# Patient Record
Sex: FEMALE | Race: WHITE
[De-identification: names, ages, dates, MRNs, and addresses within clinical notes are randomized per-mention and may not be internally consistent; named-entity substitution may affect disease eponyms.]

---

## 2021-12-01 ENCOUNTER — HOSPITAL ENCOUNTER (INPATIENT)
Dept: HOSPITAL 79 - GENOP | Age: 26
LOS: 2 days | Discharge: HOME | End: 2021-12-03
Attending: OBSTETRICS & GYNECOLOGY | Admitting: OBSTETRICS & GYNECOLOGY
Payer: COMMERCIAL

## 2021-12-01 VITALS — HEIGHT: 64 IN | WEIGHT: 210 LBS | BODY MASS INDEX: 35.85 KG/M2

## 2021-12-01 DIAGNOSIS — Z23: ICD-10-CM

## 2021-12-01 DIAGNOSIS — Z86.73: ICD-10-CM

## 2021-12-01 DIAGNOSIS — F32.A: ICD-10-CM

## 2021-12-01 DIAGNOSIS — D62: ICD-10-CM

## 2021-12-01 DIAGNOSIS — Z82.49: ICD-10-CM

## 2021-12-01 DIAGNOSIS — Z20.822: ICD-10-CM

## 2021-12-01 DIAGNOSIS — Z80.9: ICD-10-CM

## 2021-12-01 DIAGNOSIS — F41.9: ICD-10-CM

## 2021-12-01 DIAGNOSIS — Z83.3: ICD-10-CM

## 2021-12-01 DIAGNOSIS — Z3A.40: ICD-10-CM

## 2021-12-01 LAB
HGB BLD-MCNC: 10.7 GM/DL (ref 12.3–15.3)
RED BLOOD COUNT: 4.46 M/UL (ref 4–5.1)
WHITE BLOOD COUNT: 13.1 K/UL (ref 4.5–11)

## 2021-12-01 PROCEDURE — 4A1HXCZ MONITORING OF PRODUCTS OF CONCEPTION, CARDIAC RATE, EXTERNAL APPROACH: ICD-10-PCS | Performed by: OBSTETRICS & GYNECOLOGY

## 2021-12-01 PROCEDURE — U0002 COVID-19 LAB TEST NON-CDC: HCPCS

## 2021-12-01 PROCEDURE — 3E0234Z INTRODUCTION OF SERUM, TOXOID AND VACCINE INTO MUSCLE, PERCUTANEOUS APPROACH: ICD-10-PCS | Performed by: OBSTETRICS & GYNECOLOGY

## 2021-12-02 LAB — HGB BLD-MCNC: 8.9 GM/DL (ref 12.3–15.3)

## 2022-01-21 ENCOUNTER — HOSPITAL ENCOUNTER (EMERGENCY)
Dept: HOSPITAL 79 - ER1 | Age: 27
LOS: 1 days | Discharge: HOME | End: 2022-01-22
Payer: COMMERCIAL

## 2022-01-21 DIAGNOSIS — K21.9: ICD-10-CM

## 2022-01-21 DIAGNOSIS — K80.20: ICD-10-CM

## 2022-01-21 DIAGNOSIS — K29.70: Primary | ICD-10-CM

## 2022-01-21 DIAGNOSIS — E11.9: ICD-10-CM

## 2022-01-21 LAB
BUN/CREATININE RATIO: 20 (ref 0–10)
HGB BLD-MCNC: 11.7 GM/DL (ref 12.3–15.3)
RED BLOOD COUNT: 4.83 M/UL (ref 4–5.1)
WHITE BLOOD COUNT: 11.2 K/UL (ref 4.5–11)

## 2022-01-21 PROCEDURE — C9113 INJ PANTOPRAZOLE SODIUM, VIA: HCPCS

## 2024-03-13 ENCOUNTER — HOSPITAL ENCOUNTER (OUTPATIENT)
Dept: ULTRASOUND IMAGING | Facility: HOSPITAL | Age: 29
Discharge: HOME | End: 2024-03-13
Admitting: OBSTETRICS & GYNECOLOGY
Payer: COMMERCIAL

## 2024-03-13 DIAGNOSIS — R23.4 BREAST SKIN CHANGES: ICD-10-CM

## 2024-03-13 PROCEDURE — 76642 ULTRASOUND BREAST LIMITED: CPT

## 2024-08-06 ENCOUNTER — OFFICE VISIT (OUTPATIENT)
Dept: GASTROENTEROLOGY | Facility: CLINIC | Age: 29
End: 2024-08-06
Payer: COMMERCIAL

## 2024-08-06 VITALS
HEIGHT: 64 IN | HEART RATE: 76 BPM | BODY MASS INDEX: 34.83 KG/M2 | DIASTOLIC BLOOD PRESSURE: 76 MMHG | SYSTOLIC BLOOD PRESSURE: 115 MMHG | WEIGHT: 204 LBS

## 2024-08-06 DIAGNOSIS — K62.5 BRBPR (BRIGHT RED BLOOD PER RECTUM): Primary | ICD-10-CM

## 2024-08-06 DIAGNOSIS — R19.7 DIARRHEA DUE TO MALABSORPTION: ICD-10-CM

## 2024-08-06 DIAGNOSIS — K90.9 DIARRHEA DUE TO MALABSORPTION: ICD-10-CM

## 2024-08-06 RX ORDER — POLYETHYLENE GLYCOL 3350 17 G/17G
POWDER, FOR SOLUTION ORAL
Qty: 510 G | Refills: 0 | Status: SHIPPED | OUTPATIENT
Start: 2024-08-06

## 2024-08-06 RX ORDER — MONTELUKAST SODIUM 4 MG/1
1 TABLET, CHEWABLE ORAL 2 TIMES DAILY
Qty: 120 TABLET | Refills: 2 | Status: SHIPPED | OUTPATIENT
Start: 2024-08-06

## 2024-08-06 RX ORDER — LEVOTHYROXINE SODIUM 25 UG/1
25 CAPSULE ORAL
COMMUNITY
Start: 2024-07-19

## 2024-08-06 NOTE — PROGRESS NOTES
Date of Consultation:  2024  Referring Physician: Zarina Mariee MD    Chief Complaint  Diarrhea    Gina Hutchins is a 28 y.o. female who presents today to [facility] for Diarrhea.    HPI:   28-year-old female with PMH of hypothyroidism and PCOS not currently on BC who presents today for evaluation of diarrhea.  Patient states that she had her gallbladder removed in 2022 and since then, she notes that her diarrhea has gotten much worse.  States that typically after eating she will have diffuse sharp, crampy pain associated with bloating and fecal urgency.  States that she has to go to the bathroom almost immediately every time she eats and rates her stool as a BSS 5-7.  She endorses complete evacuation and occasional bright red bleeding per rectum.  She notes that this sensation is also worth during her menstrual cycle.  She notes nausea occasionally along with the pain.  She denies unintentional weight loss, vomiting, hematemesis, fever, chills, acid reflux, trouble swallowing, and melena.     Of note, patient has never had a colonoscopy before.      Previous History:   Past Medical History:   Diagnosis Date    Anemia     Cholelithiasis       Past Surgical History:   Procedure Laterality Date    CHOLECYSTECTOMY        Social History     Socioeconomic History    Marital status: Unknown   Tobacco Use    Smoking status: Former     Current packs/day: 0.00     Types: Cigarettes     Quit date: 2019     Years since quittin.5   Substance and Sexual Activity    Alcohol use: Yes     Alcohol/week: 2.0 standard drinks of alcohol     Types: 2 Cans of beer per week     Comment: I don't drink weekly but will have a beer or two on occasion    Drug use: Never    Sexual activity: Yes     Partners: Male     Birth control/protection: Condom        Current Medications:  Current Outpatient Medications   Medication Sig Dispense Refill    levothyroxine sodium (TIROSINT) 25  "MCG capsule 1 capsule.      colestipol (COLESTID) 1 g tablet Take 1 tablet by mouth 2 (Two) Times a Day. 120 tablet 2    polyethylene glycol (MIRALAX) 17 GM/SCOOP powder Take 255 g Miralax mixed with 32 oz clear liquid at 8pm night before procedure then repeat 6 hours prior to arrival to hospital. 510 g 0     No current facility-administered medications for this visit.       Allergies:   No Known Allergies    Vitals:   /76   Pulse 76   Ht 162.6 cm (64\")   Wt 92.5 kg (204 lb)   BMI 35.02 kg/m²   Estimated body mass index is 35.02 kg/m² as calculated from the following:    Height as of this encounter: 162.6 cm (64\").    Weight as of this encounter: 92.5 kg (204 lb).    Gina Hutchins  reports that she quit smoking about 5 years ago. Her smoking use included cigarettes. She does not have any smokeless tobacco history on file. I have educated her on the risk of diseases from using tobacco products such as cancer, COPD, and heart disease.     ROS:   Review of Systems   Constitutional: Negative.    HENT: Negative.     Respiratory: Negative.     Cardiovascular: Negative.    Gastrointestinal:  Positive for abdominal pain, anal bleeding and diarrhea.   Musculoskeletal: Negative.    All other systems reviewed and are negative.       Physical Exam:   Physical Exam  Vitals reviewed.   Constitutional:       Appearance: Normal appearance.   HENT:      Head: Normocephalic and atraumatic.      Nose: Nose normal.      Mouth/Throat:      Mouth: Mucous membranes are moist.   Eyes:      Extraocular Movements: Extraocular movements intact.   Cardiovascular:      Rate and Rhythm: Normal rate and regular rhythm.      Pulses: Normal pulses.      Heart sounds: Normal heart sounds.   Pulmonary:      Effort: Pulmonary effort is normal.      Breath sounds: Normal breath sounds.   Abdominal:      General: Abdomen is flat. Bowel sounds are normal. There is no distension.      Palpations: Abdomen is soft. There is no mass.      " "Tenderness: There is no abdominal tenderness. There is no guarding or rebound.      Hernia: No hernia is present.   Musculoskeletal:         General: Normal range of motion.      Cervical back: Normal range of motion.   Skin:     General: Skin is warm and dry.   Neurological:      Mental Status: She is alert and oriented to person, place, and time.   Psychiatric:         Mood and Affect: Mood normal.         Behavior: Behavior normal.         Thought Content: Thought content normal.          Lab Results:   No results found for: \"WBC\", \"HGB\", \"HCT\", \"MCV\", \"RDW\", \"PLT\", \"NEUTRORELPCT\", \"LYMPHORELPCT\", \"MONORELPCT\", \"EOSRELPCT\", \"BASORELPCT\", \"NEUTROABS\", \"LYMPHSABS\"    No results found for: \"NA\", \"K\", \"CO2\", \"CL\", \"BUN\", \"CREATININE\", \"EGFRIFNONA\", \"EGFRIFAFRI\", \"GLUCOSE\", \"CALCIUM\", \"ALKPHOS\", \"AST\", \"ALT\", \"BILITOT\", \"ALBUMIN\", \"PROTEINTOT\", \"MG\", \"PHOS\"    Pathology:        Endoscopy:        Imaging:         Results review: During today's encounter, all relevant clinical data has been reviewed.      Assessment and Plan    1. BRBPR (bright red blood per rectum) (Primary)  Due to reported bleeding per rectum, will proceed with colonoscopy for further evaluation management above.  Patient educated on risk and benefits of procedure.  She verbalized understanding and is amenable to proceeding as above.  -     Case Request; Standing  -     Follow Anesthesia Guidelines / Protocol; Standing  -     Obtain Informed Consent; Standing  -     Case Request  -     polyethylene glycol (MIRALAX) 17 GM/SCOOP powder; Take 255 g Miralax mixed with 32 oz clear liquid at 8pm night before procedure then repeat 6 hours prior to arrival to hospital.  Dispense: 510 g; Refill: 0    2. Diarrhea due to malabsorption  Patient reports diarrhea after eating that got significantly worse s/p cholecystectomy.  Largely suspect patient's diarrhea likely bile acid induced, will proceed with Colestid 1 g twice daily.  Educated patient to monitor bowel " movements closely for constipation.  -     colestipol (COLESTID) 1 g tablet; Take 1 tablet by mouth 2 (Two) Times a Day.  Dispense: 120 tablet; Refill: 2      New Medications:   New Medications Ordered This Visit   Medications    polyethylene glycol (MIRALAX) 17 GM/SCOOP powder     Sig: Take 255 g Miralax mixed with 32 oz clear liquid at 8pm night before procedure then repeat 6 hours prior to arrival to hospital.     Dispense:  510 g     Refill:  0    colestipol (COLESTID) 1 g tablet     Sig: Take 1 tablet by mouth 2 (Two) Times a Day.     Dispense:  120 tablet     Refill:  2       Discontinued Medications:   There are no discontinued medications.     Visit Diagnoses:    ICD-10-CM ICD-9-CM   1. BRBPR (bright red blood per rectum)  K62.5 569.3   2. Diarrhea due to malabsorption  K90.9 579.9    R19.7 787.91            Follow Up:   Return in about 8 weeks (around 10/1/2024).    The patient was in agreement with the plan and all questions were answered to patient's satisfaction.        This document has been electronically signed by Avis Wilson PA-C   August 6, 2024 11:22 EDT    Dictated Utilizing Dragon Dictation: Part of this note may be an electronic transcription/translation of spoken language to printed text using the Dragon Dictation System.    CC:  MD Kodi Duong Liza, MD

## 2024-08-23 PROBLEM — K62.5 BRBPR (BRIGHT RED BLOOD PER RECTUM): Status: ACTIVE | Noted: 2024-08-06

## 2024-10-02 ENCOUNTER — HOSPITAL ENCOUNTER (OUTPATIENT)
Facility: HOSPITAL | Age: 29
Setting detail: HOSPITAL OUTPATIENT SURGERY
Discharge: HOME | End: 2024-10-02
Attending: INTERNAL MEDICINE | Admitting: INTERNAL MEDICINE
Payer: COMMERCIAL

## 2024-10-02 ENCOUNTER — ANESTHESIA EVENT (OUTPATIENT)
Dept: PERIOP | Facility: HOSPITAL | Age: 29
End: 2024-10-02
Payer: COMMERCIAL

## 2024-10-02 ENCOUNTER — ANESTHESIA (OUTPATIENT)
Dept: PERIOP | Facility: HOSPITAL | Age: 29
End: 2024-10-02
Payer: COMMERCIAL

## 2024-10-02 VITALS
OXYGEN SATURATION: 95 % | DIASTOLIC BLOOD PRESSURE: 77 MMHG | HEART RATE: 86 BPM | TEMPERATURE: 97.2 F | SYSTOLIC BLOOD PRESSURE: 113 MMHG | BODY MASS INDEX: 34.15 KG/M2 | WEIGHT: 200 LBS | HEIGHT: 64 IN | RESPIRATION RATE: 17 BRPM

## 2024-10-02 DIAGNOSIS — K62.5 BRBPR (BRIGHT RED BLOOD PER RECTUM): ICD-10-CM

## 2024-10-02 LAB — HCG SERPL QL: NEGATIVE

## 2024-10-02 PROCEDURE — 84703 CHORIONIC GONADOTROPIN ASSAY: CPT | Performed by: INTERNAL MEDICINE

## 2024-10-02 PROCEDURE — 25010000002 PROPOFOL 200 MG/20ML EMULSION: Performed by: NURSE ANESTHETIST, CERTIFIED REGISTERED

## 2024-10-02 PROCEDURE — 25010000002 LIDOCAINE PF 2% 2 % SOLUTION: Performed by: NURSE ANESTHETIST, CERTIFIED REGISTERED

## 2024-10-02 PROCEDURE — 25010000002 MIDAZOLAM PER 1 MG: Performed by: NURSE ANESTHETIST, CERTIFIED REGISTERED

## 2024-10-02 PROCEDURE — 25810000003 LACTATED RINGERS PER 1000 ML: Performed by: ANESTHESIOLOGY

## 2024-10-02 PROCEDURE — 45380 COLONOSCOPY AND BIOPSY: CPT | Performed by: INTERNAL MEDICINE

## 2024-10-02 RX ORDER — SODIUM CHLORIDE 0.9 % (FLUSH) 0.9 %
10 SYRINGE (ML) INJECTION AS NEEDED
Status: DISCONTINUED | OUTPATIENT
Start: 2024-10-02 | End: 2024-10-02 | Stop reason: HOSPADM

## 2024-10-02 RX ORDER — SODIUM CHLORIDE 0.9 % (FLUSH) 0.9 %
10 SYRINGE (ML) INJECTION EVERY 12 HOURS SCHEDULED
Status: DISCONTINUED | OUTPATIENT
Start: 2024-10-02 | End: 2024-10-02 | Stop reason: HOSPADM

## 2024-10-02 RX ORDER — SODIUM CHLORIDE, SODIUM LACTATE, POTASSIUM CHLORIDE, CALCIUM CHLORIDE 600; 310; 30; 20 MG/100ML; MG/100ML; MG/100ML; MG/100ML
125 INJECTION, SOLUTION INTRAVENOUS ONCE
Status: DISCONTINUED | OUTPATIENT
Start: 2024-10-02 | End: 2024-10-02 | Stop reason: SDUPTHER

## 2024-10-02 RX ORDER — IPRATROPIUM BROMIDE AND ALBUTEROL SULFATE 2.5; .5 MG/3ML; MG/3ML
3 SOLUTION RESPIRATORY (INHALATION) ONCE AS NEEDED
Status: DISCONTINUED | OUTPATIENT
Start: 2024-10-02 | End: 2024-10-02 | Stop reason: HOSPADM

## 2024-10-02 RX ORDER — LIDOCAINE HYDROCHLORIDE 20 MG/ML
INJECTION, SOLUTION EPIDURAL; INFILTRATION; INTRACAUDAL; PERINEURAL AS NEEDED
Status: DISCONTINUED | OUTPATIENT
Start: 2024-10-02 | End: 2024-10-02 | Stop reason: SURG

## 2024-10-02 RX ORDER — MEPERIDINE HYDROCHLORIDE 25 MG/ML
12.5 INJECTION INTRAMUSCULAR; INTRAVENOUS; SUBCUTANEOUS
Status: DISCONTINUED | OUTPATIENT
Start: 2024-10-02 | End: 2024-10-02 | Stop reason: HOSPADM

## 2024-10-02 RX ORDER — OXYCODONE AND ACETAMINOPHEN 5; 325 MG/1; MG/1
1 TABLET ORAL ONCE AS NEEDED
Status: DISCONTINUED | OUTPATIENT
Start: 2024-10-02 | End: 2024-10-02 | Stop reason: HOSPADM

## 2024-10-02 RX ORDER — MIDAZOLAM HYDROCHLORIDE 1 MG/ML
1 INJECTION INTRAMUSCULAR; INTRAVENOUS
Status: DISCONTINUED | OUTPATIENT
Start: 2024-10-02 | End: 2024-10-02 | Stop reason: HOSPADM

## 2024-10-02 RX ORDER — SODIUM CHLORIDE 9 MG/ML
40 INJECTION, SOLUTION INTRAVENOUS AS NEEDED
Status: DISCONTINUED | OUTPATIENT
Start: 2024-10-02 | End: 2024-10-02 | Stop reason: SDUPTHER

## 2024-10-02 RX ORDER — ONDANSETRON 2 MG/ML
4 INJECTION INTRAMUSCULAR; INTRAVENOUS AS NEEDED
Status: DISCONTINUED | OUTPATIENT
Start: 2024-10-02 | End: 2024-10-02 | Stop reason: HOSPADM

## 2024-10-02 RX ORDER — SODIUM CHLORIDE, SODIUM LACTATE, POTASSIUM CHLORIDE, CALCIUM CHLORIDE 600; 310; 30; 20 MG/100ML; MG/100ML; MG/100ML; MG/100ML
125 INJECTION, SOLUTION INTRAVENOUS ONCE
Status: COMPLETED | OUTPATIENT
Start: 2024-10-02 | End: 2024-10-02

## 2024-10-02 RX ORDER — SODIUM CHLORIDE, SODIUM LACTATE, POTASSIUM CHLORIDE, CALCIUM CHLORIDE 600; 310; 30; 20 MG/100ML; MG/100ML; MG/100ML; MG/100ML
100 INJECTION, SOLUTION INTRAVENOUS ONCE AS NEEDED
Status: DISCONTINUED | OUTPATIENT
Start: 2024-10-02 | End: 2024-10-02 | Stop reason: HOSPADM

## 2024-10-02 RX ORDER — MIDAZOLAM HYDROCHLORIDE 1 MG/ML
INJECTION INTRAMUSCULAR; INTRAVENOUS AS NEEDED
Status: DISCONTINUED | OUTPATIENT
Start: 2024-10-02 | End: 2024-10-02 | Stop reason: SURG

## 2024-10-02 RX ORDER — SODIUM CHLORIDE 9 MG/ML
40 INJECTION, SOLUTION INTRAVENOUS AS NEEDED
Status: DISCONTINUED | OUTPATIENT
Start: 2024-10-02 | End: 2024-10-02 | Stop reason: HOSPADM

## 2024-10-02 RX ORDER — MIDAZOLAM HYDROCHLORIDE 1 MG/ML
1 INJECTION INTRAMUSCULAR; INTRAVENOUS
Status: DISCONTINUED | OUTPATIENT
Start: 2024-10-02 | End: 2024-10-02 | Stop reason: SDUPTHER

## 2024-10-02 RX ORDER — PROPOFOL 10 MG/ML
INJECTION, EMULSION INTRAVENOUS AS NEEDED
Status: DISCONTINUED | OUTPATIENT
Start: 2024-10-02 | End: 2024-10-02 | Stop reason: SURG

## 2024-10-02 RX ORDER — FENTANYL CITRATE 50 UG/ML
50 INJECTION, SOLUTION INTRAMUSCULAR; INTRAVENOUS
Status: DISCONTINUED | OUTPATIENT
Start: 2024-10-02 | End: 2024-10-02 | Stop reason: HOSPADM

## 2024-10-02 RX ADMIN — SODIUM CHLORIDE, POTASSIUM CHLORIDE, SODIUM LACTATE AND CALCIUM CHLORIDE: 600; 310; 30; 20 INJECTION, SOLUTION INTRAVENOUS at 09:12

## 2024-10-02 RX ADMIN — PROPOFOL 50 MG: 10 INJECTION, EMULSION INTRAVENOUS at 09:18

## 2024-10-02 RX ADMIN — LIDOCAINE HYDROCHLORIDE 60 MG: 20 INJECTION, SOLUTION EPIDURAL; INFILTRATION; INTRACAUDAL; PERINEURAL at 09:15

## 2024-10-02 RX ADMIN — MIDAZOLAM HYDROCHLORIDE 2 MG: 1 INJECTION, SOLUTION INTRAMUSCULAR; INTRAVENOUS at 09:12

## 2024-10-02 RX ADMIN — PROPOFOL 50 MG: 10 INJECTION, EMULSION INTRAVENOUS at 09:25

## 2024-10-02 RX ADMIN — PROPOFOL 100 MG: 10 INJECTION, EMULSION INTRAVENOUS at 09:15

## 2024-10-02 RX ADMIN — PROPOFOL 50 MG: 10 INJECTION, EMULSION INTRAVENOUS at 09:20

## 2024-10-02 RX ADMIN — PROPOFOL 50 MG: 10 INJECTION, EMULSION INTRAVENOUS at 09:22

## 2024-10-02 NOTE — ANESTHESIA PREPROCEDURE EVALUATION
Anesthesia Evaluation     no history of anesthetic complications:   NPO Solid Status: > 8 hours  NPO Liquid Status: > 8 hours           Airway   Mallampati: I  TM distance: >3 FB  Neck ROM: full  No difficulty expected  Dental - normal exam     Pulmonary - normal exam   Cardiovascular - normal exam        Neuro/Psych  GI/Hepatic/Renal/Endo    (+) GI bleeding     Musculoskeletal     Abdominal  - normal exam    Bowel sounds: normal.   Substance History      OB/GYN          Other                    Anesthesia Plan    ASA 2     general     intravenous induction     Anesthetic plan, risks, benefits, and alternatives have been provided, discussed and informed consent has been obtained with: patient.    CODE STATUS:

## 2024-10-02 NOTE — H&P
[facility] HOSPITALIST HISTORY AND PHYSICAL    Patient Identification:  Name:  Gina Hutchins  Age:  29 y.o.  Sex:  female  :  1995  MRN:  [id number]   Visit Number:  [id number]  Primary Care Physician:  Zarina Mariee MD       Chief complaint: BRBPR, Abdominal Pain, and Diarrhea     History of presenting illness:  29 y.o. female who presents today for colonoscopy for BRBPR, abdominal pain and diarrhea. She is s/p CCY since 2022. Since then, she reports frequent diarrhea that is associatede with diffuse, sharp/cramping abdominal pain, bloating, and fecal urgency. Patient was initiated on Colestid 1g BID for diarrhea. She states that the colestid has helped. She is having 1 bowel movement per day that is typically formed. She is still having occasional BRBPR when she wipes. She denies unintentional weight loss, N/V, abdominal pain, melena, and hematochezia.     Review of Systems   Constitutional: Negative.    HENT: Negative.     Respiratory: Negative.     Cardiovascular: Negative.    Gastrointestinal:  Positive for anal bleeding.   Musculoskeletal: Negative.    All other systems reviewed and are negative.       Past Medical History:   Diagnosis Date    Anemia     BRBPR (bright red blood per rectum) 2024    Cholelithiasis      Past Surgical History:   Procedure Laterality Date    CHOLECYSTECTOMY      TONSILLECTOMY       Family History   Problem Relation Age of Onset    Cirrhosis Maternal Grandmother     Liver disease Maternal Grandmother      Social History     Socioeconomic History    Marital status: Unknown   Tobacco Use    Smoking status: Former     Current packs/day: 0.00     Types: Cigarettes     Quit date: 2019     Years since quittin.7    Smokeless tobacco: Never   Vaping Use    Vaping status: Never Used   Substance and Sexual Activity    Alcohol use: Yes     Alcohol/week: 2.0 standard drinks of alcohol     Types: 2 Cans of beer per week     Comment: I don't drink  "weekly but will have a beer or two on occasion    Drug use: Never    Sexual activity: Yes     Partners: Male     Birth control/protection: Condom       Allergies:  Patient has no known allergies.    Prior to Admission Medications       Prescriptions Last Dose Informant Patient Reported? Taking?    colestipol (COLESTID) 1 g tablet   No No    Take 1 tablet by mouth 2 (Two) Times a Day.    polyethylene glycol (MIRALAX) 17 GM/SCOOP powder   No No    Take 255 g Miralax mixed with 32 oz clear liquid at 8pm night before procedure then repeat 6 hours prior to arrival to hospital.          Hospital Scheduled Meds:  lactated ringers, 125 mL/hr, Intravenous, Once  sodium chloride, 10 mL, Intravenous, Q12H  sodium chloride, 10 mL, Intravenous, Q12H             Vital Signs:  Temp:  [97 °F (36.1 °C)] 97 °F (36.1 °C)  Heart Rate:  [105] 105  Resp:  [20] 20  BP: (127)/(67) 127/67      10/02/24  0832   Weight: 90.7 kg (200 lb)     Body mass index is 34.33 kg/m².    Physical Exam:  Constitutional:  Alert and oriented. Well developed and well nourished, in no acute distress.  HENT:  Head: Normocephalic and atraumatic.  Mouth:  Moist mucous membranes.  OP clear, mmm  Eyes:  Conjunctivae and EOM are normal.  Pupils are equal, round, and reactive to light.  No scleral icterus.  Neck:  Neck supple.  No JVD present.    Cardiovascular:  RRR, no MRG.  Pulmonary/Chest:  CTAB, unlabored.   Abdominal:  Soft.  Bowel sounds are normal.  No distension and no tenderness.   Psychiatric:  Normal mood and affect.  Behavior is normal.  Judgment and thought content normal.                 Invalid input(s): \"PROT\"CrCl cannot be calculated (No successful lab value found.).  No results found for: \"AMMONIA\"          No results found for: \"HGBA1C\"  No results found for: \"TSH\", \"FREET4\"  No results found for: \"PREGTESTUR\", \"PREGSERUM\", \"HCG\", \"HCGQUANT\"  Pain Management Panel           No data to display              No results found for: \"BLOODCX\"  No " "results found for: \"URINECX\"  No results found for: \"WOUNDCX\"  No results found for: \"STOOLCX\"        Imaging Results (Last 7 Days)       ** No results found for the last 168 hours. **              Assessment and Plan:    Proceed with colonoscopy d/t hx of BRBPR and diarrhea     Avis Wilson PA-C  10/02/24  08:40 EDT    "

## 2024-10-02 NOTE — ANESTHESIA POSTPROCEDURE EVALUATION
Patient: Gina Hutchins    Procedure Summary       Date: 10/02/24 Room / Location: [facility] OR [address] [place] [address] OR    Anesthesia Start: 0912 Anesthesia Stop: 0928    Procedure: COLONOSCOPY Diagnosis:       BRBPR (bright red blood per rectum)      (BRBPR (bright red blood per rectum) [K62.5])    Surgeons: Monica Suarez MD Provider: Ruben San MD    Anesthesia Type: general ASA Status: 2            Anesthesia Type: general    Vitals  Vitals Value Taken Time   /77 10/02/24 1000   Temp 97.2 °F (36.2 °C) 10/02/24 0930   Pulse 86 10/02/24 1000   Resp 17 10/02/24 1000   SpO2 95 % 10/02/24 1000           Post Anesthesia Care and Evaluation    Patient location during evaluation: PHASE II  Patient participation: complete - patient participated  Level of consciousness: awake and alert  Pain score: 1  Pain management: adequate    Airway patency: patent  Anesthetic complications: No anesthetic complications  PONV Status: controlled  Cardiovascular status: acceptable  Respiratory status: acceptable  Hydration status: acceptable

## 2024-10-04 LAB — REF LAB TEST METHOD: NORMAL

## 2024-10-15 NOTE — PROGRESS NOTES
During your recent colonoscopy, random colon biopsies were taken secondary to diarrhea.  The biopsies were benign.  Please continue colestipol.  Please keep your follow-up appointment.

## 2024-11-05 ENCOUNTER — OFFICE VISIT (OUTPATIENT)
Dept: GASTROENTEROLOGY | Facility: CLINIC | Age: 29
End: 2024-11-05
Payer: COMMERCIAL

## 2024-11-05 VITALS
DIASTOLIC BLOOD PRESSURE: 85 MMHG | HEIGHT: 64 IN | BODY MASS INDEX: 35.55 KG/M2 | WEIGHT: 208.2 LBS | TEMPERATURE: 97.3 F | SYSTOLIC BLOOD PRESSURE: 130 MMHG | HEART RATE: 70 BPM | OXYGEN SATURATION: 97 %

## 2024-11-05 DIAGNOSIS — K90.9 DIARRHEA DUE TO MALABSORPTION: ICD-10-CM

## 2024-11-05 DIAGNOSIS — R19.7 DIARRHEA DUE TO MALABSORPTION: ICD-10-CM

## 2024-11-05 DIAGNOSIS — K62.5 BRBPR (BRIGHT RED BLOOD PER RECTUM): Primary | ICD-10-CM

## 2024-11-05 NOTE — PROGRESS NOTES
Chief Complaint  Hematochezia and Follow-up    Gina Hutchins is a 29 y.o. female who presents today to [facility] UROLOG[facility] for Hematochezia and Follow-up.    HPI:   28-year-old female with PMH of hypothyroidism and PCOS not currently on BC who presents today for evaluation of diarrhea.  Patient states that she had her gallbladder removed in February 2022 and since then, she notes that her diarrhea has gotten much worse.  States that typically after eating she will have diffuse sharp, crampy pain associated with bloating and fecal urgency.  States that she has to go to the bathroom almost immediately every time she eats and rates her stool as a BSS 5-7.  She endorses complete evacuation and occasional bright red bleeding per rectum.  She notes that this sensation is also worth during her menstrual cycle.  She notes nausea occasionally along with the pain.  Of note, patient has never had a colonoscopy before.  Patient was initiated on Colestid 1 g twice daily.  She underwent colonoscopy with Dr. Erickson on 10/2/2024 was notable for internal hemorrhoids, otherwise normal.  Random biopsies of the colon were benign.       Interval History:    Today, patient reports that she is doing much better.  She states that taking Colestid 1 g twice daily did induce constipation.  She is currently taking 1 g once daily.  She reports having a bowel movement every day or every other day.  She feels that she is evacuating well.  No straining reported.  States that the lower abdominal pain and bright red bleeding per rectum has also resolved.  She has no other complaints today.      Previous History:   Past Medical History:   Diagnosis Date    Anemia     BRBPR (bright red blood per rectum) 8/6/2024    Cholelithiasis       Past Surgical History:   Procedure Laterality Date    CHOLECYSTECTOMY      COLONOSCOPY N/A 10/2/2024    Procedure: COLONOSCOPY;  Surgeon: Monica Suarez MD;  Location:  " [facility] OR;  Service: Gastroenterology;  Laterality: N/A;    TONSILLECTOMY        Social History     Socioeconomic History    Marital status: Unknown   Tobacco Use    Smoking status: Former     Current packs/day: 0.00     Types: Cigarettes     Quit date: 2019     Years since quittin.8    Smokeless tobacco: Never   Vaping Use    Vaping status: Never Used   Substance and Sexual Activity    Alcohol use: Yes     Alcohol/week: 2.0 standard drinks of alcohol     Types: 2 Cans of beer per week     Comment: I don't drink weekly but will have a beer or two on occasion    Drug use: Never    Sexual activity: Yes     Partners: Male     Birth control/protection: Condom        Current Medications:  Current Outpatient Medications   Medication Sig Dispense Refill    colestipol (COLESTID) 1 g tablet Take 1 tablet by mouth 2 (Two) Times a Day. 120 tablet 2     No current facility-administered medications for this visit.       Allergies:   No Known Allergies    Vitals:   /85   Pulse 70   Temp 97.3 °F (36.3 °C) (Oral)   Ht 162.6 cm (64\")   Wt 94.4 kg (208 lb 3.2 oz)   SpO2 97%   BMI 35.74 kg/m²   Estimated body mass index is 35.74 kg/m² as calculated from the following:    Height as of this encounter: 162.6 cm (64\").    Weight as of this encounter: 94.4 kg (208 lb 3.2 oz).    ROS:   Review of Systems   Constitutional: Negative.    HENT: Negative.     Respiratory: Negative.     Cardiovascular: Negative.    Gastrointestinal: Negative.    Musculoskeletal: Negative.    All other systems reviewed and are negative.       Physical Exam:   Physical Exam  Vitals reviewed.   Constitutional:       General: She is not in acute distress.     Appearance: Normal appearance. She is well-groomed. She is obese. She is not toxic-appearing.   HENT:      Head: Normocephalic and atraumatic.      Mouth/Throat:      Mouth: Mucous membranes are moist.   Eyes:      Extraocular Movements: Extraocular movements intact.   Cardiovascular:      " "Rate and Rhythm: Normal rate and regular rhythm.      Heart sounds: Normal heart sounds. No murmur heard.  Pulmonary:      Effort: Pulmonary effort is normal. No respiratory distress.      Breath sounds: Normal breath sounds. No stridor. No wheezing or rales.   Abdominal:      General: Abdomen is flat. Bowel sounds are normal. There is no distension.      Palpations: Abdomen is soft. There is no mass.      Tenderness: There is no abdominal tenderness. There is no guarding or rebound.      Hernia: No hernia is present.   Skin:     General: Skin is warm.      Coloration: Skin is not jaundiced.      Findings: No rash.   Neurological:      Mental Status: She is alert and oriented to person, place, and time.   Psychiatric:         Mood and Affect: Mood and affect normal.         Speech: Speech normal.         Behavior: Behavior normal. Behavior is cooperative.         Thought Content: Thought content normal.          Lab Results:   No results found for: \"WBC\", \"HGB\", \"HCT\", \"MCV\", \"RDW\", \"PLT\", \"NEUTRORELPCT\", \"LYMPHORELPCT\", \"MONORELPCT\", \"EOSRELPCT\", \"BASORELPCT\", \"NEUTROABS\", \"LYMPHSABS\"    No results found for: \"NA\", \"K\", \"CO2\", \"CL\", \"BUN\", \"CREATININE\", \"EGFRIFNONA\", \"EGFRIFAFRI\", \"GLUCOSE\", \"CALCIUM\", \"ALKPHOS\", \"AST\", \"ALT\", \"BILITOT\", \"ALBUMIN\", \"PROTEINTOT\", \"MG\", \"PHOS\"    Pathology:        Endoscopy:        Imaging:   No Images in the past 120 days found..      Results review: During today's encounter, all relevant clinical data has been reviewed.      Assessment and Plan    1. BRBPR (bright red blood per rectum) (Primary)  2. Diarrhea due to malabsorption  Both have resolved on Colestid 1 g once daily.  Please continue.  Suspect bright red bleeding per rectum likely secondary to internal hemorrhoid irritation secondary to diarrhea.      New Medications:   No orders of the defined types were placed in this encounter.      Discontinued Medications:   There are no discontinued medications.     Visit " Diagnoses:    ICD-10-CM ICD-9-CM   1. BRBPR (bright red blood per rectum)  K62.5 569.3   2. Diarrhea due to malabsorption  K90.9 579.9    R19.7 787.91            Follow Up:   Return in about 6 months (around 5/5/2025).    The patient was in agreement with the plan and all questions were answered to patient's satisfaction.        This document has been electronically signed by Avis Wilson PA-C   November 5, 2024 09:30 EST    Dictated Utilizing Dragon Dictation: Part of this note may be an electronic transcription/translation of spoken language to printed text using the Dragon Dictation System.    CC:  No ref. provider found  Zarina Mariee MD

## 2025-02-12 ENCOUNTER — TELEPHONE (OUTPATIENT)
Dept: GASTROENTEROLOGY | Facility: CLINIC | Age: 30
End: 2025-02-12
Payer: COMMERCIAL

## 2025-02-12 DIAGNOSIS — K90.9 DIARRHEA DUE TO MALABSORPTION: ICD-10-CM

## 2025-02-12 DIAGNOSIS — R19.7 DIARRHEA DUE TO MALABSORPTION: ICD-10-CM

## 2025-02-12 RX ORDER — COLESTIPOL HYDROCHLORIDE 1 G/1
1 TABLET ORAL 2 TIMES DAILY
Qty: 120 TABLET | Refills: 5 | Status: SHIPPED | OUTPATIENT
Start: 2025-02-12

## 2025-07-22 ENCOUNTER — OFFICE VISIT (OUTPATIENT)
Dept: GASTROENTEROLOGY | Facility: CLINIC | Age: 30
End: 2025-07-22
Payer: COMMERCIAL

## 2025-07-22 VITALS
HEART RATE: 73 BPM | BODY MASS INDEX: 32.44 KG/M2 | WEIGHT: 189 LBS | SYSTOLIC BLOOD PRESSURE: 114 MMHG | DIASTOLIC BLOOD PRESSURE: 72 MMHG

## 2025-07-22 DIAGNOSIS — R19.7 DIARRHEA DUE TO MALABSORPTION: ICD-10-CM

## 2025-07-22 DIAGNOSIS — K90.9 DIARRHEA DUE TO MALABSORPTION: ICD-10-CM

## 2025-07-22 PROCEDURE — 1159F MED LIST DOCD IN RCRD: CPT

## 2025-07-22 PROCEDURE — 1160F RVW MEDS BY RX/DR IN RCRD: CPT

## 2025-07-22 PROCEDURE — 99213 OFFICE O/P EST LOW 20 MIN: CPT

## 2025-07-22 RX ORDER — COLESTIPOL HYDROCHLORIDE 1 G/1
1 TABLET ORAL DAILY
Qty: 90 TABLET | Refills: 3 | Status: SHIPPED | OUTPATIENT
Start: 2025-07-22

## 2025-07-22 RX ORDER — DOXYCYCLINE HYCLATE 100 MG
1 TABLET ORAL EVERY 12 HOURS SCHEDULED
COMMUNITY
Start: 2025-06-06

## 2025-07-22 NOTE — PROGRESS NOTES
Chief Complaint  Rectal Bleeding    Gina Hutchins is a 29 y.o. female who presents today to [facility] for Rectal Bleeding.    HPI:   29-year-old female with PMH of hypothyroidism and PCOS not currently on BC who presents today for evaluation of diarrhea.  Patient states that she had her gallbladder removed in February 2022 and since then, she notes that her diarrhea has gotten much worse.  States that typically after eating she will have diffuse sharp, crampy pain associated with bloating and fecal urgency.  States that she has to go to the bathroom almost immediately every time she eats and rates her stool as a BSS 5-7.  She endorses complete evacuation and occasional bright red bleeding per rectum.  She notes that this sensation is also worth during her menstrual cycle.  She notes nausea occasionally along with the pain.  Of note, patient has never had a colonoscopy before.  Patient was initiated on Colestid 1 g twice daily.  She underwent colonoscopy with Dr. Erickson on 10/2/2024 was notable for internal hemorrhoids, otherwise normal.  Random biopsies of the colon were benign.     11/05/2024: Today, patient reports that she is doing much better.  She states that taking Colestid 1 g twice daily did induce constipation.  She is currently taking 1 g once daily.  She reports having a bowel movement every day or every other day.  She feels that she is evacuating well.  No straining reported.  States that the lower abdominal pain and bright red bleeding per rectum has also resolved.  She has no other complaints today.           Interval History:    Today, patient presents for follow-up.  She states that she has been doing well.  She is currently taking Colestid 1 g daily.  States that she is having 2 bowel movements per day.  She does not have to strain.  She has intermittent incomplete evacuation of her colon.  However, her abdominal pain and cramping have resolved.  She  "denies unintentional weight loss, nausea, vomiting, GERD, dysphagia, abdominal pain, melena, hematochezia, and BRBPR.    Previous History:   Past Medical History:   Diagnosis Date    Anemia     BRBPR (bright red blood per rectum) 2024    Cholelithiasis       Past Surgical History:   Procedure Laterality Date    CHOLECYSTECTOMY      COLONOSCOPY N/A 10/2/2024    Procedure: COLONOSCOPY;  Surgeon: Monica Suarez MD;  Location: [facility];  Service: Gastroenterology;  Laterality: N/A;    TONSILLECTOMY        Social History     Socioeconomic History    Marital status: Significant Other   Tobacco Use    Smoking status: Former     Current packs/day: 0.00     Types: Cigarettes     Quit date: 2019     Years since quittin.5    Smokeless tobacco: Never   Vaping Use    Vaping status: Never Used   Substance and Sexual Activity    Alcohol use: Yes     Alcohol/week: 2.0 standard drinks of alcohol     Types: 2 Cans of beer per week     Comment: I don't drink weekly but will have a beer or two on occasion    Drug use: Never    Sexual activity: Yes     Partners: Male     Birth control/protection: Condom        Current Medications:  Current Outpatient Medications   Medication Sig Dispense Refill    colestipol (COLESTID) 1 g tablet Take 1 tablet by mouth Daily. 90 tablet 3    doxycycline (VIBRAMYICN) 100 MG tablet Take 1 tablet by mouth Every 12 (Twelve) Hours.       No current facility-administered medications for this visit.       Allergies:   No Known Allergies    Vitals:   /72   Pulse 73   Wt 85.7 kg (189 lb)   BMI 32.44 kg/m²   Estimated body mass index is 32.44 kg/m² as calculated from the following:    Height as of 24: 162.6 cm (64\").    Weight as of this encounter: 85.7 kg (189 lb).    ROS:   Review of Systems   Constitutional: Negative.    HENT: Negative.     Respiratory: Negative.     Cardiovascular: Negative.    Gastrointestinal:  Positive for constipation. Negative for abdominal " "distention, abdominal pain, anal bleeding, blood in stool, diarrhea, nausea, rectal pain and vomiting.   All other systems reviewed and are negative.       Physical Exam:   Physical Exam  Vitals reviewed.   Constitutional:       General: She is not in acute distress.     Appearance: Normal appearance. She is well-groomed. She is obese. She is not toxic-appearing.   HENT:      Head: Normocephalic and atraumatic.      Mouth/Throat:      Mouth: Mucous membranes are moist.   Eyes:      Extraocular Movements: Extraocular movements intact.   Cardiovascular:      Rate and Rhythm: Normal rate and regular rhythm.      Heart sounds: Normal heart sounds. No murmur heard.  Pulmonary:      Effort: Pulmonary effort is normal. No respiratory distress.      Breath sounds: Normal breath sounds. No stridor. No wheezing or rales.   Abdominal:      General: Bowel sounds are normal. There is no distension.      Palpations: Abdomen is soft. There is no mass.      Tenderness: There is no abdominal tenderness. There is no guarding or rebound.      Hernia: No hernia is present.   Skin:     General: Skin is warm.      Coloration: Skin is not jaundiced.      Findings: No rash.   Neurological:      Mental Status: She is alert and oriented to person, place, and time.   Psychiatric:         Mood and Affect: Mood and affect normal.         Speech: Speech normal.         Behavior: Behavior normal. Behavior is cooperative.         Thought Content: Thought content normal.          Lab Results:   No results found for: \"WBC\", \"HGB\", \"HCT\", \"MCV\", \"RDW\", \"PLT\", \"NEUTRORELPCT\", \"LYMPHORELPCT\", \"MONORELPCT\", \"EOSRELPCT\", \"BASORELPCT\", \"NEUTROABS\", \"LYMPHSABS\"    No results found for: \"NA\", \"K\", \"CO2\", \"CL\", \"BUN\", \"CREATININE\", \"EGFRIFNONA\", \"EGFRIFAFRI\", \"GLUCOSE\", \"CALCIUM\", \"ALKPHOS\", \"AST\", \"ALT\", \"BILITOT\", \"ALBUMIN\", \"PROTEINTOT\", \"MG\", \"PHOS\"    Pathology:        Endoscopy:        Imaging:   No Images in the past 120 days found..      Results " review: During today's encounter, all relevant clinical data has been reviewed.      Assessment and Plan    1. Diarrhea due to malabsorption  Patient reports diarrhea is well-controlled on Colestid 1 g daily.  However, she does note difficulty with intermittent constipation.  We discussed taking Colestid every other day.  However, patient states that if she skips a day of Colestid she will have abdominal cramping.  She will supplement with MiraLAX 17 g in 8 ounces of water daily as needed for constipation.  Refill Colestid sent.  -     colestipol (COLESTID) 1 g tablet; Take 1 tablet by mouth Daily.  Dispense: 90 tablet; Refill: 3      New Medications:   New Medications Ordered This Visit   Medications    colestipol (COLESTID) 1 g tablet     Sig: Take 1 tablet by mouth Daily.     Dispense:  90 tablet     Refill:  3       Discontinued Medications:   Medications Discontinued During This Encounter   Medication Reason    colestipol (COLESTID) 1 g tablet         Visit Diagnoses:    ICD-10-CM ICD-9-CM   1. Diarrhea due to malabsorption  K90.9 579.9    R19.7 787.91            Follow Up:   Return in about 1 year (around 7/22/2026).    The patient was in agreement with the plan and all questions were answered to patient's satisfaction.        This document has been electronically signed by Avis Bolton PA-C   July 22, 2025 09:51 EDT    Dictated Utilizing Dragon Dictation: Part of this note may be an electronic transcription/translation of spoken language to printed text using the Dragon Dictation System.    CC:  No ref. provider found  Zarina Mariee MD

## (undated) DEVICE — FRCP BX RADJAW4 NDL 2.8 240CM LG OG BX40

## (undated) DEVICE — ENDOGATOR TUBING FOR ENDOGATOR EGP-100 IRRIGATION PUMP,OLYMPUS OFP PUMP, OLYMPUS AFU-100 PUMP AND ERBE EIP2 PUMP: Brand: ENDOGATOR

## (undated) DEVICE — ENDOGATOR AUXILIARY WATER JET CONNECTOR: Brand: ENDOGATOR

## (undated) DEVICE — Device: Brand: DEFENDO AIR/WATER/SUCTION AND BIOPSY VALVE

## (undated) DEVICE — Device

## (undated) DEVICE — CONN Y IRR DISP 1P/U